# Patient Record
Sex: MALE | Race: WHITE | Employment: UNEMPLOYED | URBAN - METROPOLITAN AREA
[De-identification: names, ages, dates, MRNs, and addresses within clinical notes are randomized per-mention and may not be internally consistent; named-entity substitution may affect disease eponyms.]

---

## 2019-04-01 ENCOUNTER — APPOINTMENT (OUTPATIENT)
Dept: CT IMAGING | Age: 29
End: 2019-04-01
Payer: MEDICAID

## 2019-04-01 ENCOUNTER — HOSPITAL ENCOUNTER (EMERGENCY)
Age: 29
Discharge: HOME | End: 2019-04-01
Attending: EMERGENCY MEDICINE
Payer: MEDICAID

## 2019-04-01 VITALS
BODY MASS INDEX: 23.39 KG/M2 | RESPIRATION RATE: 14 BRPM | HEART RATE: 77 BPM | SYSTOLIC BLOOD PRESSURE: 122 MMHG | OXYGEN SATURATION: 100 % | TEMPERATURE: 98.2 F | WEIGHT: 177.25 LBS | DIASTOLIC BLOOD PRESSURE: 82 MMHG

## 2019-04-01 DIAGNOSIS — K64.5 THROMBOSED EXTERNAL HEMORRHOID: Primary | ICD-10-CM

## 2019-04-01 DIAGNOSIS — R10.9 ACUTE RIGHT FLANK PAIN: ICD-10-CM

## 2019-04-01 LAB
A/G RATIO: 1 (ref 1.1–2.2)
ALBUMIN SERPL-MCNC: 4.4 G/DL (ref 3.4–5)
ALP BLD-CCNC: 105 U/L (ref 40–129)
ALT SERPL-CCNC: 13 U/L (ref 10–40)
ANION GAP SERPL CALCULATED.3IONS-SCNC: 12 MMOL/L (ref 3–16)
AST SERPL-CCNC: 31 U/L (ref 15–37)
BASOPHILS ABSOLUTE: 0.1 K/UL (ref 0–0.2)
BASOPHILS RELATIVE PERCENT: 0.6 %
BILIRUB SERPL-MCNC: 0.4 MG/DL (ref 0–1)
BILIRUBIN URINE: NEGATIVE
BLOOD, URINE: NEGATIVE
BUN BLDV-MCNC: 8 MG/DL (ref 7–20)
CALCIUM SERPL-MCNC: 9.7 MG/DL (ref 8.3–10.6)
CHLORIDE BLD-SCNC: 101 MMOL/L (ref 99–110)
CLARITY: CLEAR
CO2: 24 MMOL/L (ref 21–32)
COLOR: YELLOW
CREAT SERPL-MCNC: 0.8 MG/DL (ref 0.9–1.3)
EOSINOPHILS ABSOLUTE: 0.1 K/UL (ref 0–0.6)
EOSINOPHILS RELATIVE PERCENT: 1.3 %
GFR AFRICAN AMERICAN: >60
GFR NON-AFRICAN AMERICAN: >60
GLOBULIN: 4.6 G/DL
GLUCOSE BLD-MCNC: 103 MG/DL (ref 70–99)
GLUCOSE URINE: NEGATIVE MG/DL
HCT VFR BLD CALC: 47.6 % (ref 40.5–52.5)
HEMOGLOBIN: 16.3 G/DL (ref 13.5–17.5)
KETONES, URINE: NEGATIVE MG/DL
LEUKOCYTE ESTERASE, URINE: NEGATIVE
LYMPHOCYTES ABSOLUTE: 1.5 K/UL (ref 1–5.1)
LYMPHOCYTES RELATIVE PERCENT: 18.2 %
MCH RBC QN AUTO: 33.6 PG (ref 26–34)
MCHC RBC AUTO-ENTMCNC: 34.3 G/DL (ref 31–36)
MCV RBC AUTO: 98 FL (ref 80–100)
MICROSCOPIC EXAMINATION: ABNORMAL
MONOCYTES ABSOLUTE: 0.7 K/UL (ref 0–1.3)
MONOCYTES RELATIVE PERCENT: 8.5 %
NEUTROPHILS ABSOLUTE: 6 K/UL (ref 1.7–7.7)
NEUTROPHILS RELATIVE PERCENT: 71.4 %
NITRITE, URINE: NEGATIVE
PDW BLD-RTO: 12.5 % (ref 12.4–15.4)
PH UA: 8.5 (ref 5–8)
PLATELET # BLD: 239 K/UL (ref 135–450)
PMV BLD AUTO: 9.1 FL (ref 5–10.5)
POTASSIUM SERPL-SCNC: 5.3 MMOL/L (ref 3.5–5.1)
PROTEIN UA: NEGATIVE MG/DL
RBC # BLD: 4.86 M/UL (ref 4.2–5.9)
SODIUM BLD-SCNC: 137 MMOL/L (ref 136–145)
SPECIFIC GRAVITY UA: 1.02 (ref 1–1.03)
TOTAL PROTEIN: 9 G/DL (ref 6.4–8.2)
URINE REFLEX TO CULTURE: ABNORMAL
URINE TYPE: ABNORMAL
UROBILINOGEN, URINE: 1 E.U./DL
WBC # BLD: 8.4 K/UL (ref 4–11)

## 2019-04-01 PROCEDURE — 80053 COMPREHEN METABOLIC PANEL: CPT

## 2019-04-01 PROCEDURE — 96372 THER/PROPH/DIAG INJ SC/IM: CPT

## 2019-04-01 PROCEDURE — 2500000003 HC RX 250 WO HCPCS: Performed by: PHYSICIAN ASSISTANT

## 2019-04-01 PROCEDURE — 85025 COMPLETE CBC W/AUTO DIFF WBC: CPT

## 2019-04-01 PROCEDURE — 74176 CT ABD & PELVIS W/O CONTRAST: CPT

## 2019-04-01 PROCEDURE — 6360000002 HC RX W HCPCS: Performed by: PHYSICIAN ASSISTANT

## 2019-04-01 PROCEDURE — 81003 URINALYSIS AUTO W/O SCOPE: CPT

## 2019-04-01 PROCEDURE — 99284 EMERGENCY DEPT VISIT MOD MDM: CPT

## 2019-04-01 PROCEDURE — 6370000000 HC RX 637 (ALT 250 FOR IP): Performed by: EMERGENCY MEDICINE

## 2019-04-01 RX ORDER — LIDOCAINE HYDROCHLORIDE AND EPINEPHRINE 20; 5 MG/ML; UG/ML
20 INJECTION, SOLUTION EPIDURAL; INFILTRATION; INTRACAUDAL; PERINEURAL ONCE
Status: COMPLETED | OUTPATIENT
Start: 2019-04-01 | End: 2019-04-01

## 2019-04-01 RX ORDER — ONDANSETRON 4 MG/1
4 TABLET, ORALLY DISINTEGRATING ORAL ONCE
Status: COMPLETED | OUTPATIENT
Start: 2019-04-01 | End: 2019-04-01

## 2019-04-01 RX ORDER — OXYCODONE HYDROCHLORIDE AND ACETAMINOPHEN 5; 325 MG/1; MG/1
1 TABLET ORAL ONCE
Status: COMPLETED | OUTPATIENT
Start: 2019-04-01 | End: 2019-04-01

## 2019-04-01 RX ORDER — KETOROLAC TROMETHAMINE 30 MG/ML
30 INJECTION, SOLUTION INTRAMUSCULAR; INTRAVENOUS ONCE
Status: COMPLETED | OUTPATIENT
Start: 2019-04-01 | End: 2019-04-01

## 2019-04-01 RX ORDER — OXYCODONE HYDROCHLORIDE AND ACETAMINOPHEN 5; 325 MG/1; MG/1
1 TABLET ORAL EVERY 6 HOURS PRN
Qty: 12 TABLET | Refills: 0 | Status: SHIPPED | OUTPATIENT
Start: 2019-04-01 | End: 2019-04-06

## 2019-04-01 RX ADMIN — HYDROMORPHONE HYDROCHLORIDE 2 MG: 1 INJECTION, SOLUTION INTRAMUSCULAR; INTRAVENOUS; SUBCUTANEOUS at 13:32

## 2019-04-01 RX ADMIN — LIDOCAINE HYDROCHLORIDE,EPINEPHRINE BITARTRATE 20 ML: 20; .005 INJECTION, SOLUTION EPIDURAL; INFILTRATION; INTRACAUDAL; PERINEURAL at 15:32

## 2019-04-01 RX ADMIN — KETOROLAC TROMETHAMINE 30 MG: 30 INJECTION, SOLUTION INTRAMUSCULAR; INTRAVENOUS at 13:32

## 2019-04-01 RX ADMIN — OXYCODONE AND ACETAMINOPHEN 1 TABLET: 5; 325 TABLET ORAL at 12:38

## 2019-04-01 RX ADMIN — LIDOCAINE HYDROCHLORIDE: 20 JELLY TOPICAL at 12:38

## 2019-04-01 RX ADMIN — ONDANSETRON 4 MG: 4 TABLET, ORALLY DISINTEGRATING ORAL at 12:38

## 2019-04-01 ASSESSMENT — PAIN DESCRIPTION - PROGRESSION
CLINICAL_PROGRESSION: NOT CHANGED
CLINICAL_PROGRESSION: NOT CHANGED

## 2019-04-01 ASSESSMENT — PAIN DESCRIPTION - FREQUENCY
FREQUENCY: CONTINUOUS
FREQUENCY: CONTINUOUS

## 2019-04-01 ASSESSMENT — PAIN SCALES - GENERAL
PAINLEVEL_OUTOF10: 8
PAINLEVEL_OUTOF10: 8
PAINLEVEL_OUTOF10: 3
PAINLEVEL_OUTOF10: 10

## 2019-04-01 ASSESSMENT — PAIN DESCRIPTION - LOCATION
LOCATION: FLANK
LOCATION: FLANK
LOCATION: RECTUM

## 2019-04-01 ASSESSMENT — PAIN DESCRIPTION - DESCRIPTORS
DESCRIPTORS: SHARP

## 2019-04-01 ASSESSMENT — PAIN DESCRIPTION - ONSET
ONSET: GRADUAL
ONSET: GRADUAL

## 2019-04-01 ASSESSMENT — PAIN - FUNCTIONAL ASSESSMENT: PAIN_FUNCTIONAL_ASSESSMENT: ACTIVITIES ARE NOT PREVENTED

## 2019-04-01 ASSESSMENT — PAIN DESCRIPTION - PAIN TYPE
TYPE: ACUTE PAIN

## 2019-04-01 ASSESSMENT — PAIN DESCRIPTION - ORIENTATION
ORIENTATION: RIGHT
ORIENTATION: RIGHT

## 2019-04-01 NOTE — ED NOTES
Pt ambulated to restroom to give urine specimen at this time.      Georgia Bell [facility]  04/01/19 7098

## 2019-04-01 NOTE — ED PROVIDER NOTES
I independently examined and evaluated Alli Villela. In brief their history revealed to emergency department with large hemorrhoids and right flank pain. He states he's had hemorrhoids before but \"not like this. \"  He denies hematuria, dysuria. He states he was having some urinary retention last night. He was able to urinate in the emergency department today. . Their exam revealed abdomen benign but right CVA tenderness to palpation. Large external thrombosed hemorrhoids. All diagnostic, treatment, and disposition decisions were made by myself in conjunction with the mid-level provider. Before disposition, questions were sought and answered with the family members. They have voiced understanding and agree with the plan. For all further details of the patient's emergency department visit, please see the mid-level practitioner's documentation.         Sheryle Printers, MD  04/03/19 5137

## 2019-04-01 NOTE — ED PROVIDER NOTES
CBC WITH AUTO DIFFERENTIAL    Narrative:     Performed at:  [place] [address]   Phone [phone]       All other labs were within normal range or not returned as of this dictation. EMERGENCY DEPARTMENT COURSE and DIFFERENTIAL DIAGNOSIS/MDM:   Vitals:    Vitals:    04/01/19 1158 04/01/19 1545   BP: 134/82 122/82   Pulse: 87 77   Resp: 16 14   SpO2: 100% 100%   Weight: 177 lb 4 oz (80.4 kg)        The patient's condition in the ED was good, the patient was afebrile and nontoxic in appearance, and the patient's physical exam was unremarkable other than for the findings noted above. He was given pain medication in the ED. Laboratory workup showed a normal white blood cell count, normal creatinine and BUN, urinalysis negative for infection or blood. CT scan abdomen and pelvis without contrast showed no acute findings. Patient had a large thrombosed external hemorrhoid, and I excised the thrombosis in the ED, giving the patient considerable relief of pain. There is a possibility that the patient has passed a kidney stone, or his abdominal symptoms may have been referred pain from his hemorrhoids. There was no indication for hospitalization or further workup. Is no be discharged with instructions for wound care, prescription for a course of pain medication, and referrals for family practice care and general surgery. The patient verbalized understanding and agreement with this plan of care. The patient was advised to return to the emergency department if symptoms should significantly worsen or if new and concerning symptoms should appear. I estimate there is LOW risk for ACUTE APPENDICITIS, BOWEL OBSTRUCTION, CHOLECYSTITIS, DIVERTICULITIS, INCARCERATED HERNIA, PANCREATITIS, PERFORATED BOWEL, BOWEL ISCHEMIA, GONADAL TORSION, OR CARDIAC ISCHEMIA, thus I consider the discharge disposition reasonable.  Also, there is no evidence or peritonitis, sepsis, or toxicity. PROCEDURES:  Thrombosed External Hemorrhoid Excision:  The patient had an external hemorrhoid noted on the right margin of the anal orifice. Incision and clot removal procedure was explained to the patient and the patient gave verbal consent. The area was cleaned with an alcohol swab and anesthetized in a field block and under the abscess by injection with equal parts 2% lidocaine with epinephrine and 0.5% bupivicaine through a 27-gauge needle. The hemorrhoid was then prepped with surgical scrub and rinsed with sterile saline. A #11 blade was used to make threee 1cm straight incisions and forceps were used to remove clotted blood. There was a small amount of bleeding but the bleeding was controlled. The patient experienced some pain but generally tolerated the procedure well. FINAL IMPRESSION      1. Thrombosed external hemorrhoid    2. Acute right flank pain          DISPOSITION/PLAN   DISPOSITION Decision To Discharge 04/01/2019 03:43:21 PM      [facility]  [phone]  Call   to get a family doctor, as needed    MD Manda Catalan. [place] [address]  [phone]    Call   As needed, for general surgery follow-up care      DISCHARGE MEDICATIONS:  New Prescriptions    OXYCODONE-ACETAMINOPHEN (PERCOCET) 5-325 MG PER TABLET    Take 1 tablet by mouth every 6 hours as needed for Pain for up to 5 days.        (Please note that portions of this note were completed with a voice recognition program.  Efforts were made to edit the dictations but occasionally words are mis-transcribed.)    Zee Cortes, [address] [place] [address],[address] [place] [address]  04/01/19 3375

## 2019-04-01 NOTE — ED NOTES
Bed: B-10  Expected date:   Expected time:   Means of arrival:   Comments:  #1     Diony Velasquez RN  04/01/19 2266

## 2019-04-10 ENCOUNTER — OFFICE VISIT (OUTPATIENT)
Dept: SURGERY | Age: 29
End: 2019-04-10
Payer: MEDICAID

## 2019-04-10 VITALS
DIASTOLIC BLOOD PRESSURE: 73 MMHG | HEIGHT: 73 IN | BODY MASS INDEX: 23.72 KG/M2 | OXYGEN SATURATION: 99 % | WEIGHT: 179 LBS | HEART RATE: 92 BPM | RESPIRATION RATE: 16 BRPM | SYSTOLIC BLOOD PRESSURE: 116 MMHG

## 2019-04-10 DIAGNOSIS — K64.5 THROMBOSED EXTERNAL HEMORRHOID: Primary | ICD-10-CM

## 2019-04-10 DIAGNOSIS — Z72.0 TOBACCO ABUSE: ICD-10-CM

## 2019-04-10 PROCEDURE — G8427 DOCREV CUR MEDS BY ELIG CLIN: HCPCS | Performed by: SURGERY

## 2019-04-10 PROCEDURE — 4004F PT TOBACCO SCREEN RCVD TLK: CPT | Performed by: SURGERY

## 2019-04-10 PROCEDURE — 99203 OFFICE O/P NEW LOW 30 MIN: CPT | Performed by: SURGERY

## 2019-04-10 PROCEDURE — G8420 CALC BMI NORM PARAMETERS: HCPCS | Performed by: SURGERY

## 2019-04-10 ASSESSMENT — ENCOUNTER SYMPTOMS
CONSTIPATION: 1
ANAL BLEEDING: 1
RESPIRATORY NEGATIVE: 1

## 2019-04-10 NOTE — PATIENT INSTRUCTIONS
CONSERVATIVE HEMORRHOID MANAGEMENT      Hemorrhoids can result from issues with chronic constipation or diarrhea, straining during bowel movements, sitting for long periods of time on the toilet, as well as obesity and pregnancy. Promoting bowel regularity and ease of passage of a bowel movement will decrease issues with hemorrhoids. One of the best ways to promote bowel regularity is by consuming a higher fiber diet. Current recommendations are that adults should try and consume 30 grams of fiber daily. Dietary fibers are either soluble or insoluble and work in slightly different ways, though are both equally important. Do not try and consume 30 grams of fiber all at once. Slowly increase your intake over a month. As you increase your fiber intake also increase the amount of water you drink. You may find it difficult to eat 30 grams of fiber daily. Fiber supplements such as Metamucil, Konsyl, or Citrucel are good sources of fiber that can be easily consumed when mixed with water. Soluble fiber is soluble in water and works by attracting water (like a sponge) to form a gel, which will slow down digestion and the movement of food through the gastrointestinal tract. Slowing digestion will make you feel full. Fiber supplements such as Metamucil, Konsyl, and Citrucel are good sources of soluble fiber. Some of the benefits of soluble fiber include weight control, lowering LDL (bad) cholesterol, improving blood sugar control, improving diarrhea, and promoting bowel regularity. Soluble fibers include psyllium, oranges, apples, pears, blueberries, strawberries, oatmeal, oat bran, oat cereal, lentils, beans, nuts, flaxseed, cucumbers, celery, and carrots. Insoluble fibers do not dissolve in water and work by adding bulk. They pass through the gastrointestinal tract relatively undigested and promote a laxative type effect, which decreases constipation.   Most of the vegetables and whole grains we eat contain insoluble fiber. The major benefit of insoluble fiber is decreasing constipation. Insoluble fibers include whole grains, whole wheat, wheat bran, bran, brown rice, nuts, barley, seeds, couscous, leafy vegetables, cabbage, broccoli, celery, carrots, green beans, cucumbers, grapes, and raisins. When starting to use a fiber supplement, such as Metamucil for example, do so slowly over a month. Start with 1 tablespoon a day for a week, increase to 2 tablespoons a day on week 2. If it is inconvenient to take a fiber supplement throughout the day is it acceptable to take it all before bed.     Please call [phone] with any questions/concerns

## 2019-04-10 NOTE — PROGRESS NOTES
Subjective:      Patient ID: Deepika Barrera is a 25 y.o. male. HPI  Chief Complaint: hemorrhoids  Patient referred by ED for evaluation of hemorrhoids. Patient reports symptoms of pain, bleeding. Location of symptoms is perianal. Symptoms were first noted 2 weeks ago. Underwent evacuation clot 9 days ago. slowly improving but still with pain. Occasionally has to strain with BMs. Has not been eating well or drinking water since his mom  2 weeks ago. Patient has a history of tobacco abuse. Will plan following treatment: fiber supplements, increase water intake as hemorrhoids resolving. Past Medical History:   Diagnosis Date    Acute hemorrhoid        Past Surgical History:   Procedure Laterality Date    HERNIA REPAIR      bilateral inguinal       Current Outpatient Medications   Medication Sig Dispense Refill    ibuprofen (ADVIL;MOTRIN) 600 MG tablet Take 1 tablet by mouth every 6 hours as needed for Pain 20 tablet 0     No current facility-administered medications for this visit. Prior to Admission medications    Medication Sig Start Date End Date Taking?  Authorizing Provider   ibuprofen (ADVIL;MOTRIN) 600 MG tablet Take 1 tablet by mouth every 6 hours as needed for Pain 17  Yes Rusty Hashimoto, DO         Allergies   Allergen Reactions    Amoxicillin        Social History     Socioeconomic History    Marital status: Single     Spouse name: Not on file    Number of children: Not on file    Years of education: Not on file    Highest education level: Not on file   Occupational History    Not on file   Social Needs    Financial resource strain: Not on file    Food insecurity:     Worry: Not on file     Inability: Not on file    Transportation needs:     Medical: Not on file     Non-medical: Not on file   Tobacco Use    Smoking status: Current Every Day Smoker     Packs/day: 1.00     Types: Cigarettes    Smokeless tobacco: Never Used   Substance and Sexual Activity    Alcohol use: No    Drug use: No    Sexual activity: Yes     Partners: Female   Lifestyle    Physical activity:     Days per week: Not on file     Minutes per session: Not on file    Stress: Not on file   Relationships    Social connections:     Talks on phone: Not on file     Gets together: Not on file     Attends Cheondo[employer] service: Not on file     Active member of club or organization: Not on file     Attends meetings of clubs or organizations: Not on file     Relationship status: Not on file    Intimate partner violence:     Fear of current or ex partner: Not on file     Emotionally abused: Not on file     Physically abused: Not on file     Forced sexual activity: Not on file   Other Topics Concern    Not on file   Social History Narrative    Not on file       History reviewed. No pertinent family history. Review of Systems   Constitutional: Negative. Respiratory: Negative. Cardiovascular: Negative. Gastrointestinal: Positive for anal bleeding and constipation. Hematological: Negative. All other systems reviewed and are negative. Objective:   Physical Exam   Constitutional: He is oriented to person, place, and time. He appears well-developed and well-nourished. He is cooperative. No distress. HENT:   Head: Normocephalic and atraumatic. Right Ear: External ear normal.   Left Ear: External ear normal.   Mouth/Throat: Oropharynx is clear and moist.   Eyes: Pupils are equal, round, and reactive to light. Conjunctivae and EOM are normal.   Neck: Trachea normal and normal range of motion. Neck supple. Cardiovascular: Normal rate, regular rhythm, S1 normal, S2 normal, normal heart sounds and intact distal pulses. Pulmonary/Chest: Effort normal and breath sounds normal. No respiratory distress. He has no wheezes. Abdominal: Soft. He exhibits no distension.    Genitourinary:   Genitourinary Comments: Soft resolving thrombosed hemorrhoid right anterior column     Musculoskeletal: Normal range of

## 2019-04-15 VITALS
DIASTOLIC BLOOD PRESSURE: 75 MMHG | BODY MASS INDEX: 24.15 KG/M2 | SYSTOLIC BLOOD PRESSURE: 124 MMHG | TEMPERATURE: 99.4 F | HEART RATE: 101 BPM | HEIGHT: 73 IN | RESPIRATION RATE: 18 BRPM | WEIGHT: 182.21 LBS | OXYGEN SATURATION: 97 %

## 2019-04-15 ASSESSMENT — PAIN DESCRIPTION - PAIN TYPE: TYPE: ACUTE PAIN

## 2019-04-15 ASSESSMENT — PAIN DESCRIPTION - LOCATION: LOCATION: ARM

## 2019-04-15 ASSESSMENT — PAIN DESCRIPTION - DESCRIPTORS: DESCRIPTORS: SORE

## 2019-04-15 ASSESSMENT — PAIN SCALES - GENERAL: PAINLEVEL_OUTOF10: 10

## 2019-04-16 ENCOUNTER — HOSPITAL ENCOUNTER (EMERGENCY)
Age: 29
Discharge: HOME | End: 2019-04-16
Attending: EMERGENCY MEDICINE
Payer: MEDICAID

## 2019-04-16 ENCOUNTER — HOSPITAL ENCOUNTER (EMERGENCY)
Age: 29
Discharge: LEFT BEFORE DISCHARGE | End: 2019-04-16
Payer: MEDICAID

## 2019-04-16 VITALS
DIASTOLIC BLOOD PRESSURE: 85 MMHG | SYSTOLIC BLOOD PRESSURE: 135 MMHG | OXYGEN SATURATION: 92 % | BODY MASS INDEX: 23.86 KG/M2 | WEIGHT: 180 LBS | RESPIRATION RATE: 20 BRPM | HEIGHT: 73 IN | TEMPERATURE: 97.8 F | HEART RATE: 90 BPM

## 2019-04-16 DIAGNOSIS — L02.91 ABSCESS: Primary | ICD-10-CM

## 2019-04-16 PROCEDURE — 99283 EMERGENCY DEPT VISIT LOW MDM: CPT

## 2019-04-16 PROCEDURE — 6370000000 HC RX 637 (ALT 250 FOR IP): Performed by: EMERGENCY MEDICINE

## 2019-04-16 PROCEDURE — 4500000023 HC ED LEVEL 3 PROCEDURE

## 2019-04-16 PROCEDURE — 4500000002 HC ER NO CHARGE

## 2019-04-16 PROCEDURE — 2500000003 HC RX 250 WO HCPCS: Performed by: EMERGENCY MEDICINE

## 2019-04-16 RX ORDER — ACETAMINOPHEN 500 MG
1000 TABLET ORAL ONCE
Status: COMPLETED | OUTPATIENT
Start: 2019-04-16 | End: 2019-04-16

## 2019-04-16 RX ORDER — SULFAMETHOXAZOLE AND TRIMETHOPRIM 800; 160 MG/1; MG/1
1 TABLET ORAL 2 TIMES DAILY
Qty: 14 TABLET | Refills: 0 | Status: SHIPPED | OUTPATIENT
Start: 2019-04-16 | End: 2019-04-23

## 2019-04-16 RX ORDER — LIDOCAINE HYDROCHLORIDE AND EPINEPHRINE 10; 10 MG/ML; UG/ML
20 INJECTION, SOLUTION INFILTRATION; PERINEURAL ONCE
Status: DISCONTINUED | OUTPATIENT
Start: 2019-04-16 | End: 2019-04-16

## 2019-04-16 RX ORDER — SULFAMETHOXAZOLE AND TRIMETHOPRIM 800; 160 MG/1; MG/1
1 TABLET ORAL ONCE
Status: COMPLETED | OUTPATIENT
Start: 2019-04-16 | End: 2019-04-16

## 2019-04-16 RX ORDER — LIDOCAINE HYDROCHLORIDE 10 MG/ML
20 INJECTION, SOLUTION EPIDURAL; INFILTRATION; INTRACAUDAL; PERINEURAL ONCE
Status: COMPLETED | OUTPATIENT
Start: 2019-04-16 | End: 2019-04-16

## 2019-04-16 RX ORDER — NAPROXEN 500 MG/1
500 TABLET ORAL 2 TIMES DAILY WITH MEALS
Qty: 10 TABLET | Refills: 0 | Status: SHIPPED | OUTPATIENT
Start: 2019-04-16 | End: 2021-07-20

## 2019-04-16 RX ORDER — IBUPROFEN 400 MG/1
800 TABLET ORAL ONCE
Status: COMPLETED | OUTPATIENT
Start: 2019-04-16 | End: 2019-04-16

## 2019-04-16 RX ADMIN — SULFAMETHOXAZOLE AND TRIMETHOPRIM 1 TABLET: 800; 160 TABLET ORAL at 18:33

## 2019-04-16 RX ADMIN — ACETAMINOPHEN 1000 MG: 500 TABLET ORAL at 18:33

## 2019-04-16 RX ADMIN — IBUPROFEN 800 MG: 400 TABLET, FILM COATED ORAL at 18:33

## 2019-04-16 RX ADMIN — LIDOCAINE HYDROCHLORIDE 20 ML: 10 INJECTION, SOLUTION EPIDURAL; INFILTRATION; INTRACAUDAL; PERINEURAL at 17:53

## 2019-04-16 RX ADMIN — LIDOCAINE HYDROCHLORIDE 20 ML: 10 INJECTION, SOLUTION EPIDURAL; INFILTRATION; INTRACAUDAL; PERINEURAL at 17:24

## 2019-04-16 ASSESSMENT — PAIN SCALES - GENERAL
PAINLEVEL_OUTOF10: 10

## 2019-04-16 ASSESSMENT — ENCOUNTER SYMPTOMS
GASTROINTESTINAL NEGATIVE: 1
ALLERGIC/IMMUNOLOGIC NEGATIVE: 1
RESPIRATORY NEGATIVE: 1
EYES NEGATIVE: 1

## 2019-04-16 ASSESSMENT — PAIN DESCRIPTION - PAIN TYPE: TYPE: ACUTE PAIN

## 2019-04-16 ASSESSMENT — PAIN DESCRIPTION - LOCATION: LOCATION: ARM

## 2019-04-16 ASSESSMENT — PAIN DESCRIPTION - ORIENTATION: ORIENTATION: LEFT

## 2019-04-16 NOTE — ED TRIAGE NOTES
Pt states that he noticed a abscess under his left arm pit. Pt also noticed another abscess to his butt cheek.  Pt tearful at triage

## 2019-04-16 NOTE — ED PROVIDER NOTES
[address] ENCOUNTER          ATTENDING PHYSICIAN NOTE       Date of evaluation: 4/16/2019    Chief Complaint     Abscess      History of Present Illness     Maria L Patel is a 25 y.o. male who presents with skin abscesses. He reports been present for about 5 days, localized underneath his left arm. He denies radiation of pain outside the axilla, denies fever, chest pain, or any other systemic symptoms. Reports the pain in the axilla seems to be getting worse. Also reports that he has a similar but very small area on his left butt cheek which has not been draining. He reports that the lesion under his arm has been draining when he squeezes it. Review of Systems     Review of Systems   Constitutional: Negative. HENT: Negative. Eyes: Negative. Respiratory: Negative. Cardiovascular: Negative. Gastrointestinal: Negative. Endocrine: Negative. Genitourinary: Negative. Musculoskeletal: Negative. Pain in axilla   Skin: Positive for wound. Allergic/Immunologic: Negative. Neurological: Negative. Hematological: Negative. Psychiatric/Behavioral: Negative. Past Medical, Surgical, Family, and Social History     He has a past medical history of Acute hemorrhoid. He has a past surgical history that includes hernia repair. His family history is not on file. He reports that he has been smoking cigarettes. He has been smoking about 1.00 pack per day. He has never used smokeless tobacco. He reports that he does not drink alcohol or use drugs. Medications     Previous Medications    IBUPROFEN (ADVIL;MOTRIN) 600 MG TABLET    Take 1 tablet by mouth every 6 hours as needed for Pain       Allergies     He is allergic to amoxicillin. Physical Exam     INITIAL VITALS: BP: (!) 148/92, Temp: 97.8 °F (36.6 °C), Pulse: 107, Resp: 16, SpO2: 92 %    Physical Exam   Constitutional: He is oriented to person, place, and time.  He appears well-developed. Anxious appearing   HENT:   Head: Normocephalic and atraumatic. Mouth/Throat: Oropharynx is clear and moist.   Eyes: Pupils are equal, round, and reactive to light. Neck: Normal range of motion. Cardiovascular: Normal rate and normal heart sounds. No murmur heard. Pulmonary/Chest: Effort normal and breath sounds normal.   Abdominal: Soft. Bowel sounds are normal.   Musculoskeletal:   4x3 cm area of induartion and fluctuance left anterior axillary line, no purulent drainge noted. 1x1 cm area of induration without fluctuance left buttock   Neurological: He is alert and oriented to person, place, and time. Skin: Skin is warm and dry. Psychiatric:   anxious       Diagnostic Results         RADIOLOGY:  No orders to display       LABS:   No results found for this visit on 04/16/19. ED BEDSIDE ULTRASOUND:  Ultrasound demonstrates approximately 1.5 x 2 cm pocket in the left axilla in the center of the area of induration with some surrounding cobblestoning    RECENT VITALS:  BP: 135/85,Temp: 97.8 °F (36.6 °C), Pulse: 90, Resp: 20, SpO2: 92 %     Procedures     Incision/Drainage  Date/Time: 4/16/2019 6:33 PM  Performed by: Shadi Patterson MD  Authorized by: Shadi Patterson MD     Consent:     Consent obtained:  Verbal    Consent given by:  Patient    Risks discussed:  Bleeding, incomplete drainage and infection    Alternatives discussed:  No treatment  Location:     Type:  Abscess    Size:  2x4 cm    Location:  Upper extremity    Upper extremity location:  Shoulder    Shoulder location:  L shoulder (axilla)  Pre-procedure details:     Skin preparation:  Chloraprep  Anesthesia (see MAR for exact dosages):      Anesthesia method:  Local infiltration    Local anesthetic:  Lidocaine 1% w/o epi  Procedure type:     Complexity:  Complex  Procedure details:     Incision types:  Stab incision    Incision depth:  Subcutaneous    Scalpel blade:  11    Wound management:  Probed and deloculated and irrigated with saline    Drainage:  Bloody    Drainage amount: Moderate    Wound treatment:  Drain placed    Packing material: penrose drain in loop configuration. Post-procedure details:     Patient tolerance of procedure: Tolerated with difficulty  Comments:      Penrose drain placed as round loop and tied. ED Course     Nursing Notes, Past Medical Hx, Past Surgical Hx, Social Hx,Allergies, and Family Hx were reviewed. The patient was given the following medications:  Orders Placed This Encounter   Medications    DISCONTD: lidocaine-EPINEPHrine 1 percent-1:272176 injection 20 mL    lidocaine PF 1 % injection 20 mL    ibuprofen (ADVIL;MOTRIN) tablet 800 mg    acetaminophen (TYLENOL) tablet 1,000 mg    sulfamethoxazole-trimethoprim (BACTRIM DS;SEPTRA DS) 800-160 MG per tablet 1 tablet    lidocaine 1 % injection 20 mL    sulfamethoxazole-trimethoprim (BACTRIM DS) 800-160 MG per tablet     Sig: Take 1 tablet by mouth 2 times daily for 7 days     Dispense:  14 tablet     Refill:  0    naproxen (NAPROSYN) 500 MG tablet     Sig: Take 1 tablet by mouth 2 times daily (with meals) for 5 days     Dispense:  10 tablet     Refill:  0       CONSULTS:  None    MEDICAL DECISIONMAKING / ASSESSMENT / Si Daft is a 25 y.o. male this with abscess in the left axilla. Abscess was drained in the emergency department and a loop was placed. He was instructed on the need to be seen in 1 week to have the wick removed. We will prescribe some Bactrim, Milton Solaresimer been isolated loculated lesion most consistent with staph infection, and outside of the immediate area of induration there is really no sniffing and cellulitis. We will go ahead and start patient on antibiotics, which also helped with the small abscess on his left buttocks which he says he really doesn't want to have drained today.   I think this is reasonable as it appears to be draining already and has essentially no fluctuance in the area and is very small. He has no systemic symptoms, was a little bit tachycardic on arrival but was also notably anxious. With calming down, his heart rates within normal limits, he is afebrile, looks well, comfortable going home. Clinical Impression     1.  Abscess        Disposition     PATIENT REFERRED TO:  General Surgery  [facility] [id number]  [phone]  In 1 week        DISCHARGE MEDICATIONS:  New Prescriptions    NAPROXEN (NAPROSYN) 500 MG TABLET    Take 1 tablet by mouth 2 times daily (with meals) for 5 days    SULFAMETHOXAZOLE-TRIMETHOPRIM (BACTRIM DS) 800-160 MG PER TABLET    Take 1 tablet by mouth 2 times daily for 7 days       DISPOSITION Decision To Discharge 04/16/2019 06:27:38 PM         Chely Thomas MD  04/16/19 5651

## 2019-11-18 ENCOUNTER — HOSPITAL ENCOUNTER (EMERGENCY)
Age: 29
Discharge: HOME | End: 2019-11-18
Attending: EMERGENCY MEDICINE
Payer: MEDICAID

## 2019-11-18 VITALS
DIASTOLIC BLOOD PRESSURE: 78 MMHG | RESPIRATION RATE: 19 BRPM | SYSTOLIC BLOOD PRESSURE: 136 MMHG | HEART RATE: 75 BPM | HEIGHT: 73 IN | TEMPERATURE: 98.7 F | BODY MASS INDEX: 20.72 KG/M2 | WEIGHT: 156.31 LBS | OXYGEN SATURATION: 96 %

## 2019-11-18 DIAGNOSIS — F42.4 SKIN-PICKING DISORDER: Primary | ICD-10-CM

## 2019-11-18 PROCEDURE — 99282 EMERGENCY DEPT VISIT SF MDM: CPT

## 2019-11-18 PROCEDURE — 6370000000 HC RX 637 (ALT 250 FOR IP): Performed by: EMERGENCY MEDICINE

## 2019-11-18 RX ORDER — SULFAMETHOXAZOLE AND TRIMETHOPRIM 800; 160 MG/1; MG/1
1 TABLET ORAL ONCE
Status: COMPLETED | OUTPATIENT
Start: 2019-11-18 | End: 2019-11-18

## 2019-11-18 RX ORDER — SULFAMETHOXAZOLE AND TRIMETHOPRIM 800; 160 MG/1; MG/1
1 TABLET ORAL 2 TIMES DAILY
Qty: 14 TABLET | Refills: 0 | Status: SHIPPED | OUTPATIENT
Start: 2019-11-18 | End: 2019-11-25

## 2019-11-18 RX ADMIN — SULFAMETHOXAZOLE AND TRIMETHOPRIM 1 TABLET: 800; 160 TABLET ORAL at 20:08

## 2019-11-18 ASSESSMENT — PAIN DESCRIPTION - PAIN TYPE
TYPE: ACUTE PAIN
TYPE: ACUTE PAIN

## 2019-11-18 ASSESSMENT — PAIN DESCRIPTION - FREQUENCY: FREQUENCY: CONTINUOUS

## 2019-11-18 ASSESSMENT — PAIN DESCRIPTION - DESCRIPTORS: DESCRIPTORS: DISCOMFORT

## 2019-11-18 ASSESSMENT — PAIN SCALES - GENERAL
PAINLEVEL_OUTOF10: 9
PAINLEVEL_OUTOF10: 9

## 2019-11-18 ASSESSMENT — PAIN DESCRIPTION - LOCATION
LOCATION: FACE
LOCATION: FACE

## 2020-06-25 ENCOUNTER — HOSPITAL ENCOUNTER (EMERGENCY)
Age: 30
Discharge: HOME | End: 2020-06-25
Payer: COMMERCIAL

## 2020-06-25 VITALS
HEIGHT: 74 IN | SYSTOLIC BLOOD PRESSURE: 113 MMHG | BODY MASS INDEX: 21.81 KG/M2 | RESPIRATION RATE: 16 BRPM | HEART RATE: 60 BPM | DIASTOLIC BLOOD PRESSURE: 77 MMHG | WEIGHT: 169.97 LBS | OXYGEN SATURATION: 96 % | TEMPERATURE: 97.7 F

## 2020-06-25 PROCEDURE — 99282 EMERGENCY DEPT VISIT SF MDM: CPT

## 2020-06-25 RX ORDER — HYDROCORTISONE 25 MG/G
CREAM TOPICAL
Qty: 1 TUBE | Refills: 1 | Status: SHIPPED | OUTPATIENT
Start: 2020-06-25

## 2020-06-25 RX ORDER — HYDROCORTISONE ACETATE 25 MG/1
25 SUPPOSITORY RECTAL 2 TIMES DAILY
Qty: 20 SUPPOSITORY | Refills: 0 | Status: SHIPPED | OUTPATIENT
Start: 2020-06-25

## 2020-06-25 ASSESSMENT — PAIN DESCRIPTION - FREQUENCY
FREQUENCY: CONTINUOUS
FREQUENCY: CONTINUOUS

## 2020-06-25 ASSESSMENT — PAIN SCALES - GENERAL
PAINLEVEL_OUTOF10: 7
PAINLEVEL_OUTOF10: 7

## 2020-06-25 ASSESSMENT — PAIN DESCRIPTION - PAIN TYPE
TYPE: ACUTE PAIN
TYPE: ACUTE PAIN

## 2020-06-25 ASSESSMENT — PAIN DESCRIPTION - PROGRESSION
CLINICAL_PROGRESSION: GRADUALLY WORSENING
CLINICAL_PROGRESSION: NOT CHANGED

## 2020-06-25 ASSESSMENT — PAIN DESCRIPTION - ONSET
ONSET: GRADUAL
ONSET: GRADUAL

## 2020-06-25 ASSESSMENT — PAIN DESCRIPTION - ORIENTATION
ORIENTATION: INNER
ORIENTATION: INNER

## 2020-06-25 ASSESSMENT — PAIN - FUNCTIONAL ASSESSMENT: PAIN_FUNCTIONAL_ASSESSMENT: 0-10

## 2020-06-25 ASSESSMENT — PAIN DESCRIPTION - LOCATION
LOCATION: RECTUM
LOCATION: RECTUM

## 2020-06-25 ASSESSMENT — PAIN DESCRIPTION - DESCRIPTORS
DESCRIPTORS: THROBBING
DESCRIPTORS: THROBBING

## 2021-03-30 ENCOUNTER — HOSPITAL ENCOUNTER (EMERGENCY)
Age: 31
Discharge: HOME | End: 2021-03-30
Attending: EMERGENCY MEDICINE
Payer: COMMERCIAL

## 2021-03-30 VITALS
WEIGHT: 174 LBS | OXYGEN SATURATION: 100 % | BODY MASS INDEX: 22.34 KG/M2 | TEMPERATURE: 98.4 F | SYSTOLIC BLOOD PRESSURE: 100 MMHG | RESPIRATION RATE: 16 BRPM | DIASTOLIC BLOOD PRESSURE: 76 MMHG | HEART RATE: 110 BPM

## 2021-03-30 DIAGNOSIS — R11.2 NON-INTRACTABLE VOMITING WITH NAUSEA, UNSPECIFIED VOMITING TYPE: ICD-10-CM

## 2021-03-30 DIAGNOSIS — T40.1X1A ACCIDENTAL OVERDOSE OF HEROIN, INITIAL ENCOUNTER (HCC): Primary | ICD-10-CM

## 2021-03-30 PROCEDURE — 99283 EMERGENCY DEPT VISIT LOW MDM: CPT

## 2021-03-30 RX ORDER — ONDANSETRON 4 MG/1
4 TABLET, ORALLY DISINTEGRATING ORAL EVERY 8 HOURS PRN
Qty: 20 TABLET | Refills: 0 | Status: SHIPPED | OUTPATIENT
Start: 2021-03-30 | End: 2021-07-20

## 2021-03-30 ASSESSMENT — ENCOUNTER SYMPTOMS
COUGH: 0
COLOR CHANGE: 0
ABDOMINAL PAIN: 0
WHEEZING: 0
NAUSEA: 1
VOMITING: 1
SORE THROAT: 0
BACK PAIN: 0
SHORTNESS OF BREATH: 0
DIARRHEA: 0

## 2021-03-30 ASSESSMENT — PAIN SCALES - GENERAL: PAINLEVEL_OUTOF10: 0

## 2021-03-31 NOTE — ED NOTES
Pt curled up on right side admits to using thought was heroin     Salma Stuart, EMERITA  03/30/21 1529

## 2021-03-31 NOTE — ED PROVIDER NOTES
**ADVANCED PRACTICE PROVIDER, I HAVE EVALUATED THIS PATIENT**        [address] [facility] ENCOUNTER      Pt Name: Yasmine Collazo  YPU:[id number]  [place] 1990  Date of evaluation: 3/30/2021  Provider: KAILEY Polanco CNP      Chief Complaint:    Chief Complaint   Patient presents with    Drug Overdose     Pt states he doesnt know what he took, pt states he snorted it, pt states feeling weird, per girlfriend pt got narcan at home       Nursing Notes, Past Medical Hx, Past Surgical Hx, Social Hx, Allergies, and Family Hx were all reviewed and agreed with or any disagreements were addressed in the HPI.    HPI:  (Location, Duration, Timing, Severity, Quality, Assoc Sx, Context, Modifying factors)  This is a  26 y.o. male presents emergency department with his father and fiancé for drug overdose. Patient's fiancé is also a patient as they both snorted some type of powder, initially he tells me that they snorted heroin however, his fiancée is saying that she snorted fentanyl. Patient's dad states that the patient has been telling him he has been clean, however patient was feeling weird after snorting this type of substance, he was unsure exactly what it was, his fiancée states he was getting very sleepy and tired, she gave him Narcan. Patient arrives to the ER awake and alert however he is actively vomiting. Patient denies any recent illnesses, fever, chills, cough or congestion. He does admit to snorting heroin on a daily basis. He denies any pain on exam, he states he feels very nauseous. He denies any additional complaints, no additional aggravating relieving factors. The patient presents awake, alert and in no acute distress or toxic appearance.     PastMedical/Surgical History:      Diagnosis Date    Acute hemorrhoid          Procedure Laterality Date    HERNIA REPAIR      bilateral inguinal       Medications:  Discharge Medication List as of 3/30/2021 10:13 PM      CONTINUE these medications which have NOT CHANGED    Details   ANUSOL-HC 2.5 % CREA rectal cream Apply to rectum TID PRN for anal pain. Do not use with suppositories. , Disp-1 Tube, R-1, DAWPrint      hydrocortisone (ANUSOL-HC) 25 MG suppository Place 1 suppository rectally 2 times daily, Disp-20 suppository, R-0Print      naproxen (NAPROSYN) 500 MG tablet Take 1 tablet by mouth 2 times daily (with meals) for 5 days, Disp-10 tablet, R-0Print      ibuprofen (ADVIL;MOTRIN) 600 MG tablet Take 1 tablet by mouth every 6 hours as needed for Pain, Disp-20 tablet, R-0Print               Review of Systems:  Review of Systems   Constitutional: Negative for chills and fever. HENT: Negative for congestion and sore throat. Respiratory: Negative for cough, shortness of breath and wheezing. Cardiovascular: Negative for chest pain. Gastrointestinal: Positive for nausea and vomiting. Negative for abdominal pain and diarrhea. Patient's girlfriend gave him Narcan prior to ED arrival, he arrived to the ER actively vomiting. Genitourinary: Negative for dysuria, frequency and hematuria. Musculoskeletal: Negative for back pain. Skin: Negative for color change. Neurological: Negative for weakness, numbness and headaches. Patient started feeling very weird after snorting a substance that he thought was heroin. He does admit to drug use daily, uses heroin intranasally daily. Positives and Pertinent negatives as per HPI. Except as noted above in the ROS, problem specific ROS was completed and is negative. Physical Exam:  Physical Exam  Vitals signs and nursing note reviewed. Constitutional:       Appearance: He is well-developed. He is not diaphoretic. HENT:      Head: Normocephalic. Right Ear: External ear normal.      Left Ear: External ear normal.   Eyes:      General: No scleral icterus. Right eye: No discharge. Left eye: No discharge.    Neck: Musculoskeletal: Normal range of motion and neck supple. Cardiovascular:      Rate and Rhythm: Tachycardia present. Comments: Normal S1 and 2, initially tachycardic, peripheral pulses are 2+ and thready, patient did receive Narcan recently. Pulmonary:      Effort: Pulmonary effort is normal. No respiratory distress. Breath sounds: Normal breath sounds. Abdominal:      General: Bowel sounds are normal.      Palpations: Abdomen is soft. Musculoskeletal: Normal range of motion. Skin:     General: Skin is warm. Capillary Refill: Capillary refill takes less than 2 seconds. Coloration: Skin is not pale. Neurological:      General: No focal deficit present. Mental Status: He is alert and oriented to person, place, and time. GCS: GCS eye subscore is 4. GCS verbal subscore is 5. GCS motor subscore is 6. Psychiatric:         Behavior: Behavior normal.         MEDICAL DECISION MAKING    Vitals:    Vitals:    03/30/21 2058 03/30/21 2133 03/30/21 2156 03/30/21 2204   BP: 124/88 133/72 105/68 100/76   Pulse: 126 111 116 110   Resp: 22 18 16    Temp: 98.4 °F (36.9 °C)      TempSrc: Oral      SpO2: 99% 98% 96% 100%   Weight:    174 lb (78.9 kg)       LABS:Labs Reviewed - No data to display     Remainder of labs reviewed and werenegative at this time or not returned at the time of this note. RADIOLOGY:   Non-plain film images such as CT, Ultrasound and MRI are read by the radiologist. Shanel YEPEZ APRN - CNP have directly visualized the radiologic plain film image(s) with the below findings:        Interpretation per the Radiologist below, if available at the time of this note:    No orders to display        No results found.        MEDICAL DECISION MAKING / ED COURSE:    Because of high probability of sudden clinical deterioration of the patient's condition and risk of further deterioration, critical care time required my full attention to the patient's condition; which included consider the discharge disposition reasonable. Therefore, shared medical decision was made between the patient and myself only agreed that the patient could be discharged home with outpatient follow-up. Patient was discharged home with referral back to PCP. Discharged home about education with opiate withdrawal, cessation of use. He was given Narcan overdose kit. Educated to return for any worsening symptoms. The patient tolerated their visit well. I evaluated the patient. The physician was available for consultation as needed. The patient and / or the family were informed of the results of any tests, a time was given to answer questions, a plan was proposed and they agreed with plan. Patient and father verbalized understanding of discharge instructions and the patient was discharged from the department in stable condition. CLINICAL IMPRESSION:  1. Accidental overdose of heroin, initial encounter ([facility] [address]    2.  Non-intractable vomiting with nausea, unspecified vomiting type        DISPOSITION Decision To Discharge 03/30/2021 10:11:39 PM      PATIENT REFERRED TO:  [facility]) Pre-Services  [phone]  Schedule an appointment as soon as possible for a visit in 2 days  Follow-up with PCP in the next 2 to 3 days for reevaluation    [address] [facility] [place] [address]  [phone]  Go to   If symptoms worsen      DISCHARGE MEDICATIONS:  Discharge Medication List as of 3/30/2021 10:13 PM      START taking these medications    Details   Naloxone HCl (NALOXONE OPIATE OVERDOSE KIT) 1 each by Nasal route once for 1 dose, Disp-1 kit, R-0Print      ondansetron (ZOFRAN ODT) 4 MG disintegrating tablet Take 1 tablet by mouth every 8 hours as needed for Nausea, Disp-20 tablet, R-0Print             DISCONTINUED MEDICATIONS:  Discharge Medication List as of 3/30/2021 10:13 PM                 (Please note the MDM and HPI sections of this note were completed with a voice recognition program.  Efforts were made to edit the dictations but occasionally words are mis-transcribed.)    Electronically signed, KAILEY Hart CNP,           KAILEY Hart CNP  04/02/21 1022

## 2021-07-20 ENCOUNTER — APPOINTMENT (OUTPATIENT)
Dept: GENERAL RADIOLOGY | Age: 31
End: 2021-07-20
Payer: COMMERCIAL

## 2021-07-20 ENCOUNTER — HOSPITAL ENCOUNTER (EMERGENCY)
Age: 31
Discharge: HOME | End: 2021-07-20
Attending: STUDENT IN AN ORGANIZED HEALTH CARE EDUCATION/TRAINING PROGRAM
Payer: COMMERCIAL

## 2021-07-20 VITALS
HEART RATE: 78 BPM | TEMPERATURE: 98.2 F | BODY MASS INDEX: 22.13 KG/M2 | WEIGHT: 167 LBS | DIASTOLIC BLOOD PRESSURE: 74 MMHG | OXYGEN SATURATION: 90 % | SYSTOLIC BLOOD PRESSURE: 136 MMHG | RESPIRATION RATE: 16 BRPM | HEIGHT: 73 IN

## 2021-07-20 DIAGNOSIS — J18.9 PNEUMONIA OF BOTH LUNGS DUE TO INFECTIOUS ORGANISM, UNSPECIFIED PART OF LUNG: Primary | ICD-10-CM

## 2021-07-20 PROCEDURE — 6370000000 HC RX 637 (ALT 250 FOR IP): Performed by: STUDENT IN AN ORGANIZED HEALTH CARE EDUCATION/TRAINING PROGRAM

## 2021-07-20 PROCEDURE — 99284 EMERGENCY DEPT VISIT MOD MDM: CPT

## 2021-07-20 PROCEDURE — 71045 X-RAY EXAM CHEST 1 VIEW: CPT

## 2021-07-20 RX ORDER — DIPHENHYDRAMINE HCL 25 MG
25 CAPSULE ORAL EVERY 4 HOURS PRN
Qty: 20 CAPSULE | Refills: 0 | Status: SHIPPED | OUTPATIENT
Start: 2021-07-20 | End: 2021-07-30

## 2021-07-20 RX ORDER — ACETAMINOPHEN 325 MG/1
650 TABLET ORAL ONCE
Status: COMPLETED | OUTPATIENT
Start: 2021-07-20 | End: 2021-07-20

## 2021-07-20 RX ORDER — BENZONATATE 100 MG/1
100-200 CAPSULE ORAL 3 TIMES DAILY PRN
Qty: 60 CAPSULE | Refills: 0 | Status: SHIPPED | OUTPATIENT
Start: 2021-07-20 | End: 2021-07-30

## 2021-07-20 RX ORDER — AZITHROMYCIN 250 MG/1
250 TABLET, FILM COATED ORAL SEE ADMIN INSTRUCTIONS
Qty: 6 TABLET | Refills: 0 | Status: SHIPPED | OUTPATIENT
Start: 2021-07-20 | End: 2021-07-25

## 2021-07-20 RX ADMIN — ACETAMINOPHEN 650 MG: 325 TABLET ORAL at 09:48

## 2021-07-20 ASSESSMENT — PAIN SCALES - GENERAL
PAINLEVEL_OUTOF10: 6
PAINLEVEL_OUTOF10: 4
PAINLEVEL_OUTOF10: 6

## 2021-07-20 ASSESSMENT — PAIN DESCRIPTION - LOCATION: LOCATION: THROAT;RIB CAGE

## 2021-07-20 ASSESSMENT — PAIN DESCRIPTION - PAIN TYPE: TYPE: ACUTE PAIN

## 2021-07-20 ASSESSMENT — PAIN DESCRIPTION - DESCRIPTORS: DESCRIPTORS: SORE

## 2021-07-20 NOTE — ED PROVIDER NOTES
[address] [facility] ENCOUNTER      Pt Name: Evaristo Cote  MRN: [id number]  [place] 1990  Date of evaluation: 7/20/2021  Provider: Angelic Dhillon MD    CHIEF COMPLAINT       Chief Complaint   Patient presents with    URI     cough, SOB, congested, sore throat,body aches. Wife stated he had a fever yesterday, he denied any fever     Cough, shortness of breath, congested. HISTORY OF PRESENT ILLNESS   (Location/Symptom, Timing/Onset,Context/Setting, Quality, Duration, Modifying Factors, Severity)  Note limiting factors. Evaristo Cote is a 26 y.o. male who presents to the emergency department complaint of cough for the past week, shortness of breath started yesterday. Onset gradual, progressively worse, associated with congestion, cough productive of sputum, sore throat, body aches. The wife is concerned that he had a fever yesterday, patient however denies any fever. He does not know if he has been exposed any sick contacts, states he does not believe in the Covid vaccine and therefore did not get it. Mild chest discomfort described as sharpness when coughing. Symptoms not otherwise alleviated or exacerbated by the factors. NursingNotes were reviewed. REVIEW OF SYSTEMS    (2-9 systems for level 4, 10 or more for level 5)       Constitutional: No fever or chills. Eye: No visual disturbances. No eye pain. Ear/Nose/Mouth/Throat: No nasal congestion. No sore throat. Respiratory: + cough, + shortness of breath, + sputum production. Cardiovascular: + chest pain. No palpitations. Gastrointestinal: No abdominal pain. No nausea or vomiting  Genitourinary: No dysuria. No hematuria. Hematology/Lymphatics: No bleeding or bruising tendency. Immunologic: No malaise. No swollen glands. Musculoskeletal: No back pain. No joint pain. Integumentary: No rash. No abrasions. Neurologic: No headache. No focal numbness or weakness.       PAST MEDICAL HISTORY     Past Medical History:   Diagnosis Date    Acute hemorrhoid          SURGICALHISTORY       Past Surgical History:   Procedure Laterality Date    HEMORRHOID SURGERY      HERNIA REPAIR      bilateral inguinal         CURRENT MEDICATIONS       Discharge Medication List as of 7/20/2021 10:22 AM      CONTINUE these medications which have NOT CHANGED    Details   ANUSOL-HC 2.5 % CREA rectal cream Apply to rectum TID PRN for anal pain. Do not use with suppositories. , Disp-1 Tube, R-1, DAWPrint      hydrocortisone (ANUSOL-HC) 25 MG suppository Place 1 suppository rectally 2 times daily, Disp-20 suppository, R-0Print             ALLERGIES     Amoxicillin    FAMILY HISTORY     History reviewed. No pertinent family history. SOCIAL HISTORY       Social History     Socioeconomic History    Marital status: Single     Spouse name: None    Number of children: None    Years of education: None    Highest education level: None   Occupational History    None   Tobacco Use    Smoking status: Current Every Day Smoker     Packs/day: 2.00     Types: Cigarettes    Smokeless tobacco: Current User   Vaping Use    Vaping Use: Never used   Substance and Sexual Activity    Alcohol use: No    Drug use: No     Comment: started using again snorting heroin today    Sexual activity: Yes     Partners: Female   Other Topics Concern    None   Social History Narrative    None     Social Determinants of Health     Financial Resource Strain:     Difficulty of Paying Living Expenses:    Food Insecurity:     Worried About Running Out of Food in the Last Year:     Ran Out of Food in the Last Year:    Transportation Needs:     Lack of Transportation (Medical):      Lack of Transportation (Non-Medical):    Physical Activity:     Days of Exercise per Week:     Minutes of Exercise per Session:    Stress:     Feeling of Stress :    Social Connections:     Frequency of Communication with Friends and Family:     Frequency of Social Gatherings with Friends and Family:     Attends Rastafari Services:     Active Member of Clubs or Organizations:     Attends Club or Organization Meetings:     Marital Status:    Intimate Partner Violence:     Fear of Current or Ex-Partner:     Emotionally Abused:     Physically Abused:     Sexually Abused:        SCREENINGS             PHYSICAL EXAM    (up to 7 for level 4, 8 or more for level 5)     ED Triage Vitals [21 0934]   BP Temp Temp Source Pulse Resp SpO2 Height Weight   136/74 98.2 °F (36.8 °C) Oral 78 16 90 % 6' 1\" (1.854 m) 167 lb (75.8 kg)       General: Alert and oriented appropriately for age, No acute distress. Nontoxic-appearing. Eye: Normal conjunctiva. Pupils equal and reactive. HENT: Oral mucosa is moist.  Respiratory: Respirations even and non-labored. Coarse breath sounds bilaterally. Cardiovascular: Normal rate, Regular rhythm. Intact peripheral pulses. No edema. No JVD. Gastrointestinal: Soft, Non-tender, Non-distended. Musculoskeletal: No swelling. Integumentary: Warm, Dry. Neurologic: Alert and appropriate for age. No focal deficits. Psychiatric: Cooperative. DIAGNOSTIC RESULTS         RADIOLOGY:   Non-plain filmimages such as CT, Ultrasound and MRI are read by the radiologist. Plain radiographic images are visualized and preliminarily interpreted by the emergency physician with the below findings:      Interpretation per the Radiologist below, if available at the time ofthis note:    XR CHEST PORTABLE   Final Result   Bilateral infrahilar airspace disease greater on the right. This could   represent atelectasis and or pneumonia. Gilson Monaco                EMERGENCY DEPARTMENT COURSE and DIFFERENTIAL DIAGNOSIS/MDM:   Vitals:    Vitals:    21 0934   BP: 136/74   Pulse: 78   Resp: 16   Temp: 98.2 °F (36.8 °C)   TempSrc: Oral   SpO2: 90%   Weight: 167 lb (75.8 kg)   Height: 6' 1\" (1.854 m)         Medical decision makin-year-old man who presents with cough, shortness of breath only while he was working, sputum production, sore throat, body aches, on exam, no evidence of any exudative pharyngitis or findings concerning for bacterial pharyngitis however patient does have mild erythema here, does have coarse breath sounds bilaterally, significant smoking history, smoking 2 packs/day. Possibly undiagnosed COPD. Patient does have slight hypoxia 90 to 92% on room air, he appears in no respiratory distress, no increased work of breathing, moving air well to the bilateral bases, this is unlikely an obstructive process. He is offered admission for hypoxemic respiratory failure, diagnosis of subsequent pneumonia which is likely responsible for his symptoms given the multifocal nature, potential viral pneumonia as patient is unvaccinated, patient declined Covid testing despite counseling. As he is wholly nontoxic-appearing, patient wishes to be discharged home with antibiotics. Counseled patient that should he have any symptomatic worsening, he should return to the emergency department for admission, further treatment. He voices understanding, understands the risks of potential decompensation and death by being discharged home with his mild hypoxia without further treatment and supportive care. Subsequently discharged home, ambulated steadily from the emergency department upon discharge, Rx antibiotics, Tessalon Perles. Medications   acetaminophen (TYLENOL) tablet 650 mg (650 mg Oral Given 7/20/21 0948)         Low risk for PE and PERC criteria negative:    Age 48 or less   or less  SpO2 on RA 95% or greater  No Hx of VTE  No trauma or surgery within 4 weeks  No hemoptysis  No exogenous estrogen  No unilateral leg swelling. Given these findings, there is a less than a 2% risk that Vinny Hamilton has a PE and no further work-up is needed in conjunction with my shared decision making discussion with Vinny Hamilton. FINAL IMPRESSION      1.  Pneumonia of both lungs due to infectious organism, unspecified part of lung          DISPOSITION/PLAN   DISPOSITION Decision To Discharge 07/20/2021 10:06:16 AM      PATIENT REFERRED TO:  [address]  [place] [address]  [phone]    If symptoms worsen    [facility]) Pre-Services  [phone]          DISCHARGE MEDICATIONS:  Discharge Medication List as of 7/20/2021 10:22 AM      START taking these medications    Details   azithromycin (ZITHROMAX) 250 MG tablet Take 1 tablet by mouth See Admin Instructions for 5 days 500mg on day 1 followed by 250mg on days 2 - 5, Disp-6 tablet, R-0Print      benzonatate (TESSALON) 100 MG capsule Take 1-2 capsules by mouth 3 times daily as needed for Cough, Disp-60 capsule, R-0Print      diphenhydrAMINE (BENADRYL) 25 MG capsule Take 1 capsule by mouth every 4 hours as needed for Itching, Disp-20 capsule, R-0Print                (Please note that portions of this note were completed with a voice recognition program.Efforts were made to edit the dictations but occasionally words are mis-transcribed.)    Teddy Mesa MD (electronically signed)  Attending Emergency Physician          Teddy Mesa MD  07/23/21 2987

## 2021-08-05 ENCOUNTER — HOSPITAL ENCOUNTER (EMERGENCY)
Age: 31
Discharge: HOME | End: 2021-08-05
Attending: EMERGENCY MEDICINE
Payer: COMMERCIAL

## 2021-08-05 VITALS
OXYGEN SATURATION: 98 % | BODY MASS INDEX: 21.39 KG/M2 | TEMPERATURE: 98.2 F | RESPIRATION RATE: 16 BRPM | HEIGHT: 73 IN | HEART RATE: 70 BPM | WEIGHT: 161.38 LBS | DIASTOLIC BLOOD PRESSURE: 64 MMHG | SYSTOLIC BLOOD PRESSURE: 107 MMHG

## 2021-08-05 DIAGNOSIS — J18.9 PNEUMONIA OF BOTH LOWER LOBES DUE TO INFECTIOUS ORGANISM: Primary | ICD-10-CM

## 2021-08-05 PROCEDURE — 99283 EMERGENCY DEPT VISIT LOW MDM: CPT

## 2021-08-05 RX ORDER — AZITHROMYCIN 250 MG/1
TABLET, FILM COATED ORAL
Qty: 1 PACKET | Refills: 0 | Status: SHIPPED | OUTPATIENT
Start: 2021-08-05 | End: 2021-08-09

## 2021-08-05 RX ORDER — BENZONATATE 100 MG/1
100 CAPSULE ORAL 3 TIMES DAILY PRN
Qty: 30 CAPSULE | Refills: 0 | Status: SHIPPED | OUTPATIENT
Start: 2021-08-05 | End: 2021-08-12

## 2021-08-05 NOTE — ED PROVIDER NOTES
eMERGENCY dEPARTMENT eNCOUnter      Pt Name: Sherice Dubose  MRN: [id number]  [place] 1990  Date of evaluation: 8/5/2021  Provider: Vy Atkinson MD     [address] [place] [address]       Chief Complaint   Patient presents with    Fatigue     pt states that he was just seen here and is still not feeling well. Pt feels weak and needs more time off work to sleep but needs a work note. HISTORY OF PRESENT ILLNESS   (Location/Symptom, Timing/Onset,Context/Setting, Quality, Duration, Modifying Factors, Severity) Note limiting factors. HPI    Sherice Dubose is a 26 y.o. male who presents to the emergency department with feeling fatigue and tired. Patient states has not felt well yet after he was treated for pneumonia last week. Patient try to go back to work today and was feeling too weak and fatigued. There has been still an nonproductive cough. Patient works in a factory. He also has a chronic rash. Not itchy. Patient wants a work note. Patient states he might need more time. Nursing Notes were reviewed. REVIEW OFSYSTEMS    (2+ for level 4; 10+ for level 5)   Review of Systems    General: No fevers, chills or night sweats, No weight loss    Head:  No Sore throat,  No Ear Pain    Chest:  Nontender. No Cough, No SOB,  Chest Pain    GI: No abdominal pain or vomiting    : No dysuria or hematuria    Musculoskeletal: No unrelenting pain or night pain    Neurologic: No bowel or bladder incontinence, No saddle anesthesia, No leg weakness    All other systems reviewed and are negative. PAST MEDICAL HISTORY     Past Medical History:   Diagnosis Date    Acute hemorrhoid        SURGICAL HISTORY       Past Surgical History:   Procedure Laterality Date    HEMORRHOID SURGERY      HERNIA REPAIR      bilateral inguinal       CURRENT MEDICATIONS       Previous Medications    ANUSOL-HC 2.5 % CREA RECTAL CREAM    Apply to rectum TID PRN for anal pain. Do not use with suppositories.     HYDROCORTISONE (ANUSOL-HC) 25 MG SUPPOSITORY    Place 1 suppository rectally 2 times daily       ALLERGIES     Amoxicillin    FAMILY HISTORY     History reviewed. No pertinent family history. SOCIAL HISTORY       Social History     Socioeconomic History    Marital status: Single     Spouse name: None    Number of children: None    Years of education: None    Highest education level: None   Occupational History    None   Tobacco Use    Smoking status: Current Every Day Smoker     Packs/day: 3.00     Types: Cigarettes    Smokeless tobacco: Current User   Vaping Use    Vaping Use: Never used   Substance and Sexual Activity    Alcohol use: No    Drug use: No     Comment: started using again snorting heroin today    Sexual activity: Yes     Partners: Female   Other Topics Concern    None   Social History Narrative    None     Social Determinants of Health     Financial Resource Strain:     Difficulty of Paying Living Expenses:    Food Insecurity:     Worried About Running Out of Food in the Last Year:     Ran Out of Food in the Last Year:    Transportation Needs:     Lack of Transportation (Medical):      Lack of Transportation (Non-Medical):    Physical Activity:     Days of Exercise per Week:     Minutes of Exercise per Session:    Stress:     Feeling of Stress :    Social Connections:     Frequency of Communication with Friends and Family:     Frequency of Social Gatherings with Friends and Family:     Attends Mormon Services:     Active Member of Clubs or Organizations:     Attends Club or Organization Meetings:     Marital Status:    Intimate Partner Violence:     Fear of Current or Ex-Partner:     Emotionally Abused:     Physically Abused:     Sexually Abused:        SCREENINGS           PHYSICAL EXAM    (up to 7 for level 4, 8 or more for level 5)     ED Triage Vitals [08/05/21 1159]   BP Temp Temp src Pulse Resp SpO2 Height Weight   107/64 98.2 °F (36.8 °C) -- 70 16 98 % 6' 1\" (1.854 m) 161 lb 6 oz (73.2 kg)       Physical Exam    General: Alert and awake ×3. Nontoxic appearance. Well-developed well-nourished 75-year-old male in no distress. Patient is a smoker. HEENT: Normocephalic atraumatic. Neck is supple. Airway intact. No adenopathy  Cardiac: Regular rate and rhythm with no murmurs rubs or gallops  Pulmonary: Lungs are clear in all lung fields. No wheezing. No Rales. Abdomen: Soft and nontender. Negative hepatosplenomegaly. Bowel sounds are active  Extremities: Moving all extremities. No calf tenderness. Peripheral pulses all intact  Skin: Few small multiple bites in the extremity lower extremity specifically. Is chronic. Is been there for a while. Patient really did not mention it. Neurologic: Cranial nerves II through XII was grossly intact. Nonfocal neurological exam  Psychiatric: Patient is pleasant. Mood is appropriate. DIAGNOSTIC RESULTS     EKG (Per Emergency Physician):       RADIOLOGY (Per Emergency Physician): Interpretation per the Radiologist below, if available at the time of this note:  No results found. ED BEDSIDE ULTRASOUND:   Performed by ED Physician - none    LABS:  Labs Reviewed - No data to display     All other labs were within normal range or not returned as of this dictation. Procedures      EMERGENCY DEPARTMENT COURSE and DIFFERENTIAL DIAGNOSIS/MDM:   Vitals:    Vitals:    08/05/21 1159   BP: 107/64   Pulse: 70   Resp: 16   Temp: 98.2 °F (36.8 °C)   SpO2: 98%   Weight: 161 lb 6 oz (73.2 kg)   Height: 6' 1\" (1.854 m)       Medications - No data to display    MDM. Patient is a healthy 75-year-old recently diagnosed with pneumonia in the lower base been on Z-Jairo and Tessalon Perles which does help he is currently off of it now feeling weak. Does not think he can go back to work which is very exertional.  Patient has no fever. Still has a nonproductive cough. Exam is unremarkable.   Patient will be placed back on Tessalon Perles and another course of Z-Louise. Will go back to work in about 3 days. Discharged in good condition. REVAL:         CRITICAL CARE TIME   Total CriticalCare time was 0 minutes, excluding separately reportable procedures. There was a high probability of clinically significant/life threatening deterioration in the patient's condition which required my urgent intervention. CONSULTS:  None    PROCEDURES:  Unless otherwise noted below, none     [unfilled]    FINAL IMPRESSION      1. Pneumonia of both lower lobes due to infectious organism          DISPOSITION/PLAN   DISPOSITION Decision To Discharge 08/05/2021 12:37:51 PM      PATIENT REFERRED TO:  Family physician    Schedule an appointment as soon as possible for a visit in 1 week  If symptoms worsen      DISCHARGE MEDICATIONS:  New Prescriptions    AZITHROMYCIN (ZITHROMAX Z-LOUISE) 250 MG TABLET    Take 2 tablets (500 mg) on Day 1, and then take 1 tablet (250 mg) on days 2 through 5. BENZONATATE (TESSALON PERLES) 100 MG CAPSULE    Take 1 capsule by mouth 3 times daily as needed for Cough          (Please note:  Portions of this note were completed with a voice recognition program.Efforts were made to edit the dictations but occasionally words and phrases are mis-transcribed.)  Form v2016. J.[id number]    Rosibel TERRAZAS MD (electronically signed)  Emergency Medicine Provider        Jerson Jones MD  08/05/21 4775

## 2021-08-05 NOTE — ED NOTES
Pt d/c home with AVS and script x 2 sent to pharmacy , pt denies questions about f/u      Amadna Donato RN  08/05/21 6362

## 2021-12-19 ENCOUNTER — HOSPITAL ENCOUNTER (EMERGENCY)
Age: 31
Discharge: HOME | End: 2021-12-19
Payer: COMMERCIAL

## 2021-12-19 VITALS
DIASTOLIC BLOOD PRESSURE: 90 MMHG | OXYGEN SATURATION: 99 % | RESPIRATION RATE: 17 BRPM | HEART RATE: 75 BPM | SYSTOLIC BLOOD PRESSURE: 154 MMHG | TEMPERATURE: 98.4 F

## 2021-12-19 DIAGNOSIS — L85.3 DRY SKIN DERMATITIS: ICD-10-CM

## 2021-12-19 DIAGNOSIS — L03.90 CELLULITIS, UNSPECIFIED CELLULITIS SITE: Primary | ICD-10-CM

## 2021-12-19 DIAGNOSIS — F42.4 PICKING OWN SKIN: ICD-10-CM

## 2021-12-19 PROCEDURE — 99282 EMERGENCY DEPT VISIT SF MDM: CPT

## 2021-12-19 RX ORDER — CEPHALEXIN 500 MG/1
500 CAPSULE ORAL 4 TIMES DAILY
Qty: 40 CAPSULE | Refills: 0 | Status: SHIPPED | OUTPATIENT
Start: 2021-12-19 | End: 2021-12-29

## 2021-12-19 RX ORDER — SULFAMETHOXAZOLE AND TRIMETHOPRIM 800; 160 MG/1; MG/1
1 TABLET ORAL 2 TIMES DAILY
Qty: 20 TABLET | Refills: 0 | Status: SHIPPED | OUTPATIENT
Start: 2021-12-19 | End: 2021-12-29

## 2021-12-19 ASSESSMENT — PAIN DESCRIPTION - DESCRIPTORS: DESCRIPTORS: SORE

## 2021-12-19 ASSESSMENT — PAIN SCALES - GENERAL
PAINLEVEL_OUTOF10: 3
PAINLEVEL_OUTOF10: 3

## 2021-12-19 ASSESSMENT — PAIN DESCRIPTION - LOCATION: LOCATION: SCROTUM

## 2021-12-19 ASSESSMENT — PAIN DESCRIPTION - PAIN TYPE: TYPE: ACUTE PAIN

## 2021-12-19 NOTE — ED PROVIDER NOTES
[facility] ENCOUNTER        Pt Name: Lelon Spatz  MRN: [id number]  [place] 1990  Date of evaluation: 12/19/2021  Provider: RADHA Mir      ADVANCED PRACTICE PROVIDER, I HAVE EVALUATED THIS PATIENT    CHIEF COMPLAINT     Scrotal itching and swelling  Scabs on arms    HISTORY OF PRESENT ILLNESS  (Location/Symptom, Timing/Onset, Context/Setting, Quality, Duration,Modifying Factors, Severity.)   Lelon Spatz is a 33 y.o. male who presents to the emergencydepartment for scrotal itching and swelling for the past 4 days. Also reports skin is flaking. denies testicular pain. Denies penile discharge or dysuria. Also reports he has been getting pimples on his bilateral arms and legs and pops them then they get infected. This has happened in the past and has cleared up with antibiotic use. Denies fever chills nausea vomiting abdominal pain. Denies IVDU. Nursing Notes were reviewed and I agree. REVIEW OF SYSTEMS    (2-9 systems for level 4, 10 or more for level 5)   Review of Systems     Pertinent positives and negatives as per HPI. PAST MEDICAL HISTORY         Diagnosis Date    Acute hemorrhoid        SURGICAL HISTORY         Procedure Laterality Date    HEMORRHOID SURGERY      HERNIA REPAIR      bilateral inguinal       CURRENT MEDICATIONS       Discharge Medication List as of 12/19/2021  2:16 PM      CONTINUE these medications which have NOT CHANGED    Details   ANUSOL-HC 2.5 % CREA rectal cream Apply to rectum TID PRN for anal pain. Do not use with suppositories. , Disp-1 Tube, R-1, DAWPrint      hydrocortisone (ANUSOL-HC) 25 MG suppository Place 1 suppository rectally 2 times daily, Disp-20 suppository, R-0Print             ALLERGIES     Amoxicillin and Tramadol    FAMILY HISTORY     History reviewed. No pertinent family history. No family status information on file.         SOCIAL HISTORY      reports that he has been smoking cigarettes. He has been smoking about 3.00 packs per day. He uses smokeless tobacco. He reports that he does not drink alcohol and does not use drugs. PHYSICAL EXAM    (up to 7 for level 4, 8 or more for level 5)     ED Triage Vitals [12/19/21 1341]   BP Temp Temp Source Pulse Resp SpO2 Height Weight   (!) 158/100 98.4 °F (36.9 °C) Oral 77 16 99 % -- --       Physical Exam  Constitutional:       General: He is not in acute distress. Appearance: Normal appearance. He is well-developed. He is not ill-appearing, toxic-appearing or diaphoretic. HENT:      Head: Normocephalic and atraumatic. Pulmonary:      Effort: Pulmonary effort is normal. No respiratory distress. Genitourinary:     Comments: The scrotum has no edema, but the skin does appear very dry and is cracked and scabbed in a few places. Skin is slightly erythematous indurated. There is no fluctuant abscess. Testicles and epididymis are nontender to palpation bilaterally. Circumcised penis with few scabbed areas. No vesicles. The scabbed areas on the scrotum and penile shaft are nontender. No large or deep wounds in the genital area    Arun pa RN chaperoned my  exam  Musculoskeletal:      Cervical back: Normal range of motion and neck supple. Skin:     Comments: Multiple scabbed erythemateous areas scattered on the dorsal forearms and elbow. No significant surrounding erythema or edema. Appears to be healing    Also with similar scabbed areas on the anterior thighs    No petechiae, skin sloughing ulceration or vesicles   Neurological:      Mental Status: He is alert. Psychiatric:         Mood and Affect: Mood normal.         Behavior: Behavior normal.         Thought Content:  Thought content normal.         Judgment: Judgment normal.         DIFFERENTIAL DIAGNOSIS   Dry skin, cellulitis, abscess, herpes, syphilis, fourniers gangrene, tinea, toher    DIAGNOSTICRESULTS         RADIOLOGY:   Non-plain film images such as CT, Ultrasound and MRI are read by the radiologist. Plain radiographic images are visualized and preliminarily interpreted by RADHA Muro with the below findings:      Interpretation per the Radiologist below, if available at the time of this note:    No orders to display         LABS:  Labs Reviewed - No data to display    All other labs were within normal range or not returned as of this dictation. EMERGENCY DEPARTMENT COURSE and DIFFERENTIALDIAGNOSIS/MDM:   Vitals:    Vitals:    12/19/21 1341 12/19/21 1423   BP: (!) 158/100 (!) 154/90   Pulse: 77 75   Resp: 16 17   Temp: 98.4 °F (36.9 °C)    TempSrc: Oral    SpO2: 99% 99%       Patient wasnontoxic, well appearing, afebrile with normal vital signs with the exception of  /100. Denies testicular pain and testicles are nontender on exam.  Do not suspect torsion. This is a skin issue. Scrotal skin appears very dry and appears to have mild cellulitis. Low suspicion for abscess, sepsis and raymon's gangrene. Low suspicion for herpes and syphilis. Will treat with bactrim and keflex. Also has been picking at skin on arms and legs and may have become secondarily infected. Antibiotics will help with that. Instructed to FU with PCP in next few days for reevaluation. Instructed that if this gets worse or if he develops testicular pain, then he needs to return or go to [facility] ED. He agreed and understood    PROCEDURES:  None    FINAL IMPRESSION      1. Cellulitis, unspecified cellulitis site    2. Dry skin dermatitis    3.  Picking own skin        DISPOSITION/PLAN   DISPOSITION Decision To Discharge 12/19/2021 02:10:17 PM      PATIENT REFERRED TO:  [facility]) Pre-Services  [phone]  Schedule an appointment as soon as possible for a visit in 2 days  for reevaluation    [address]  [place] [address] [phone]    As needed, If symptoms worsen      DISCHARGE MEDICATIONS:  Discharge Medication List as of 12/19/2021  2:16 PM      START taking these medications    Details   mineral oil-hydrophilic petrolatum (AQUAPHOR) ointment Apply topically twice daily to scrotum. , Disp-50 g, R-0, Normal      sulfamethoxazole-trimethoprim (BACTRIM DS) 800-160 MG per tablet Take 1 tablet by mouth 2 times daily for 10 days, Disp-20 tablet, R-0Normal      cephALEXin (KEFLEX) 500 MG capsule Take 1 capsule by mouth 4 times daily for 10 days, Disp-40 capsule, R-0Normal             (Please note that portions ofthis note were completed with a voice recognition program.  Efforts were made to edit the dictations but occasionally words are mis-transcribed.)    RADHA Kenny [place]  12/19/21 3982

## 2023-03-31 ENCOUNTER — HOSPITAL ENCOUNTER (EMERGENCY)
Age: 33
Discharge: HOME | End: 2023-03-31
Payer: COMMERCIAL

## 2023-03-31 VITALS
DIASTOLIC BLOOD PRESSURE: 57 MMHG | OXYGEN SATURATION: 96 % | HEIGHT: 73 IN | TEMPERATURE: 97.7 F | HEART RATE: 80 BPM | SYSTOLIC BLOOD PRESSURE: 98 MMHG | RESPIRATION RATE: 18 BRPM | WEIGHT: 169 LBS | BODY MASS INDEX: 22.4 KG/M2

## 2023-03-31 DIAGNOSIS — L29.9 GENERALIZED PRURITUS: Primary | ICD-10-CM

## 2023-03-31 DIAGNOSIS — B35.4 TINEA CORPORIS: ICD-10-CM

## 2023-03-31 PROCEDURE — 99283 EMERGENCY DEPT VISIT LOW MDM: CPT

## 2023-03-31 RX ORDER — HYDROXYZINE PAMOATE 25 MG/1
25 CAPSULE ORAL 3 TIMES DAILY PRN
Qty: 30 CAPSULE | Refills: 0 | Status: SHIPPED | OUTPATIENT
Start: 2023-03-31 | End: 2023-04-10

## 2023-03-31 RX ORDER — CLOTRIMAZOLE 1 %
CREAM (GRAM) TOPICAL
Qty: 45 G | Refills: 0 | Status: SHIPPED | OUTPATIENT
Start: 2023-03-31 | End: 2023-04-07

## 2023-03-31 ASSESSMENT — PAIN - FUNCTIONAL ASSESSMENT: PAIN_FUNCTIONAL_ASSESSMENT: NONE - DENIES PAIN

## 2023-03-31 NOTE — ED PROVIDER NOTES
[address] ED  EMERGENCY DEPARTMENT ENCOUNTER        Pt Name: Jasson Stevens  MRN: [id number]  [place] 1990  Date of evaluation: 3/31/2023  Provider: RADHA Wei  PCP: No primary care provider on file. Note Started: 9:55 AM EDT 3/31/23      {Shared Not Shared:24996}      CHIEF COMPLAINT       Chief Complaint   Patient presents with   Dayla Hock     Pt states he has a rash he thinks is ring worm. HISTORY OF PRESENT ILLNESS: 1 or more Elements     {History from (Optional):64074}    {Limitations to history (Optional):98427}    Jasson Stevens is a 27 y.o. male with past medical history of tobacco abuse and hemorrhoids who presents via private vehicle from his home for evaluation of rash who presents ***    Nursing Notes were all reviewed and agreed with or any disagreements were addressed in the HPI. REVIEW OF SYSTEMS :      Review of Systems    Positives and Pertinent negatives as per HPI. SURGICAL HISTORY     Past Surgical History:   Procedure Laterality Date    HEMORRHOID SURGERY      HERNIA REPAIR      bilateral inguinal       CURRENTMEDICATIONS       Previous Medications    ANUSOL-HC 2.5 % CREA RECTAL CREAM    Apply to rectum TID PRN for anal pain. Do not use with suppositories. HYDROCORTISONE (ANUSOL-HC) 25 MG SUPPOSITORY    Place 1 suppository rectally 2 times daily    MINERAL OIL-HYDROPHILIC PETROLATUM (AQUAPHOR) OINTMENT    Apply topically twice daily to scrotum. ALLERGIES     Amoxicillin and Tramadol    FAMILYHISTORY     History reviewed. No pertinent family history.      SOCIAL HISTORY       Social History     Tobacco Use    Smoking status: Every Day     Packs/day: 3.00     Types: Cigarettes    Smokeless tobacco: Current   Vaping Use    Vaping Use: Never used   Substance Use Topics    Alcohol use: No    Drug use: No     Comment: started using again snorting heroin today       SCREENINGS        Mony Coma Scale  Eye Opening: Spontaneous  Best Verbal Tube, R-1, DAWPrint      hydrocortisone (ANUSOL-HC) 25 MG suppository Place 1 suppository rectally 2 times daily, Disp-20 suppository, R-0Print             ALLERGIES     Amoxicillin and Tramadol    FAMILYHISTORY     History reviewed. No pertinent family history. SOCIAL HISTORY       Social History     Tobacco Use    Smoking status: Every Day     Packs/day: 3.00     Types: Cigarettes    Smokeless tobacco: Current   Vaping Use    Vaping Use: Never used   Substance Use Topics    Alcohol use: No    Drug use: No     Comment: started using again snorting heroin today       SCREENINGS        Mony Coma Scale  Eye Opening: Spontaneous  Best Verbal Response: Oriented  Best Motor Response: Obeys commands  [place] Coma Scale Score: 15                CIWA Assessment  BP: (!) 98/57  Heart Rate: 80           PHYSICAL EXAM  1 or more Elements     ED Triage Vitals [03/31/23 0942]   BP Temp Temp Source Heart Rate Resp SpO2 Height Weight   (!) 98/57 97.7 °F (36.5 °C) Oral 80 18 96 % 6' 1\" (1.854 m) 169 lb (76.7 kg)       Physical Exam    PHYSICAL EXAM  BP (!) 98/57   Pulse 80   Temp 97.7 °F (36.5 °C) (Oral)   Resp 18   Ht 6' 1\" (1.854 m)   Wt 169 lb (76.7 kg)   SpO2 96%   BMI 22.30 kg/m²   GENERAL APPEARANCE: Awake and alert. Cooperative. Nontoxic-appearing adult male sitting upright in exam chair nondiaphoretic breathing comfortably on room air showing no sign of acute distress. Seen and evaluated by myself in room R1   HEAD: Normocephalic. Atraumatic. EYES: PERRL. EOM's grossly intact. ENT: Mucous membranes are moist.  No oral lesions. Pink Ignacio NECK: Supple. HEART: RRR. No murmurs. Radial pulses 2+ symmetric  LUNGS: Respirations unlabored. CTAB. Good air exchange. Speaking comfortably in full sentences. EXTREMITIES: No peripheral edema. Moves all extremities equally without assistance or difficulty. All extremities neurovascularly intact. SKIN: Warm and dry.   There are areas of excoriation and multiple scabs in

## 2023-11-11 ENCOUNTER — HOSPITAL ENCOUNTER (EMERGENCY)
Age: 33
Discharge: HOME | End: 2023-11-12
Attending: STUDENT IN AN ORGANIZED HEALTH CARE EDUCATION/TRAINING PROGRAM
Payer: COMMERCIAL

## 2023-11-11 DIAGNOSIS — T40.601A NARCOTIC OVERDOSE, ACCIDENTAL OR UNINTENTIONAL, INITIAL ENCOUNTER (HCC): Primary | ICD-10-CM

## 2023-11-11 PROCEDURE — 99284 EMERGENCY DEPT VISIT MOD MDM: CPT

## 2023-11-12 VITALS
DIASTOLIC BLOOD PRESSURE: 62 MMHG | RESPIRATION RATE: 15 BRPM | HEART RATE: 68 BPM | SYSTOLIC BLOOD PRESSURE: 105 MMHG | TEMPERATURE: 97.9 F | OXYGEN SATURATION: 98 % | BODY MASS INDEX: 20.74 KG/M2 | WEIGHT: 157.19 LBS

## 2023-11-12 ASSESSMENT — PAIN - FUNCTIONAL ASSESSMENT: PAIN_FUNCTIONAL_ASSESSMENT: NONE - DENIES PAIN

## 2023-11-12 NOTE — ED NOTES
Pt resting in bed with eyes closed, even unlabored respirations. Updated pt they are cleared for discharge. Bus pass will be provided to pt upon discharge.       Lauren Goodrich RN  11/12/23 1315

## 2023-11-12 NOTE — ED TRIAGE NOTES
Pt arrives via EMS for drug overdose. EMS states they gave 4 mg Narcan intranasal prior to arrival. Pt responds to questions. EMS states pt was found down at a gas station.

## 2023-11-12 NOTE — ED PROVIDER NOTES
[address] [facility] ENCOUNTER        Pt Name: Katalina Raya  MRN: [id number]  [address] [facility] 1990  Date of evaluation: 11/11/2023  Provider: Nasreen Nick MD  PCP: No primary care provider on file. Note Started: 6:19 AM EST 11/12/23    CHIEF COMPLAINT       Chief Complaint   Patient presents with    Drug Overdose     Pt arrives via EMS for drug overdose. EMS states they gave 4 mg Narcan intranasal prior to arrival. Pt responds to questions. EMS states pt was found down at a gas station. HISTORY OF PRESENT ILLNESS: 1 or more Elements     History from : Patient and EMS    Limitations to history : Intoxication    Katalina Raya is a 26 y.o. male who presents status post overdose, suspected narcotic use, EMS gave Narcan to improvement in patient's mental status, patient now able to answer questions, was found agonal respirations, 4 mg intranasal Narcan was given by EMS with improvement. Patient able to endorse drug use but otherwise not cooperative with initial history obtainment. Nursing Notes were all reviewed and agreed with or any disagreements were addressed in the HPI. REVIEW OF SYSTEMS :      Positives and Pertinent negatives as per HPI. ROS otherwise unremarkable. SURGICAL HISTORY     Past Surgical History:   Procedure Laterality Date    HEMORRHOID SURGERY      HERNIA REPAIR      bilateral inguinal       CURRENTMEDICATIONS       Current Discharge Medication List        CONTINUE these medications which have NOT CHANGED    Details   mineral oil-hydrophilic petrolatum (AQUAPHOR) ointment Apply topically twice daily to scrotum. Qty: 50 g, Refills: 0      ANUSOL-HC 2.5 % CREA rectal cream Apply to rectum TID PRN for anal pain. Do not use with suppositories.   Qty: 1 Tube, Refills: 1      hydrocortisone (ANUSOL-HC) 25 MG suppository Place 1 suppository rectally 2 times daily  Qty: 20 suppository, Refills: 0             ALLERGIES     Amoxicillin and

## 2023-11-12 NOTE — ED NOTES
Patient ambulatory from ED and provided with a bus pass home. AVS provided and discussed with patient. All questions answered. Patient verbalizes understanding of discharge instructions. Respirations even and easy. No obvious distress at this time.       Delores Webster, [address]  11/12/23 5872

## 2024-03-03 ENCOUNTER — HOSPITAL ENCOUNTER (EMERGENCY)
Age: 34
Discharge: HOME | End: 2024-03-03
Attending: STUDENT IN AN ORGANIZED HEALTH CARE EDUCATION/TRAINING PROGRAM
Payer: COMMERCIAL

## 2024-03-03 ENCOUNTER — APPOINTMENT (OUTPATIENT)
Dept: GENERAL RADIOLOGY | Age: 34
End: 2024-03-03
Attending: STUDENT IN AN ORGANIZED HEALTH CARE EDUCATION/TRAINING PROGRAM
Payer: COMMERCIAL

## 2024-03-03 VITALS
RESPIRATION RATE: 16 BRPM | HEIGHT: 73 IN | OXYGEN SATURATION: 97 % | SYSTOLIC BLOOD PRESSURE: 128 MMHG | BODY MASS INDEX: 19.99 KG/M2 | TEMPERATURE: 98.1 F | HEART RATE: 84 BPM | DIASTOLIC BLOOD PRESSURE: 81 MMHG | WEIGHT: 150.79 LBS

## 2024-03-03 DIAGNOSIS — S93.401A SPRAIN OF RIGHT ANKLE, UNSPECIFIED LIGAMENT, INITIAL ENCOUNTER: Primary | ICD-10-CM

## 2024-03-03 DIAGNOSIS — M79.671 RIGHT FOOT PAIN: ICD-10-CM

## 2024-03-03 PROCEDURE — 73630 X-RAY EXAM OF FOOT: CPT

## 2024-03-03 PROCEDURE — 73610 X-RAY EXAM OF ANKLE: CPT

## 2024-03-03 PROCEDURE — 6370000000 HC RX 637 (ALT 250 FOR IP): Performed by: STUDENT IN AN ORGANIZED HEALTH CARE EDUCATION/TRAINING PROGRAM

## 2024-03-03 PROCEDURE — 99284 EMERGENCY DEPT VISIT MOD MDM: CPT

## 2024-03-03 RX ORDER — ACETAMINOPHEN 500 MG
1000 TABLET ORAL ONCE
Status: COMPLETED | OUTPATIENT
Start: 2024-03-03 | End: 2024-03-03

## 2024-03-03 RX ORDER — IBUPROFEN 800 MG/1
800 TABLET ORAL ONCE
Status: COMPLETED | OUTPATIENT
Start: 2024-03-03 | End: 2024-03-03

## 2024-03-03 RX ADMIN — ACETAMINOPHEN 1000 MG: 500 TABLET ORAL at 13:22

## 2024-03-03 RX ADMIN — IBUPROFEN 800 MG: 800 TABLET, FILM COATED ORAL at 13:22

## 2024-03-03 ASSESSMENT — PAIN DESCRIPTION - PAIN TYPE: TYPE: ACUTE PAIN

## 2024-03-03 ASSESSMENT — PAIN SCALES - GENERAL
PAINLEVEL_OUTOF10: 3
PAINLEVEL_OUTOF10: 1
PAINLEVEL_OUTOF10: 4

## 2024-03-03 ASSESSMENT — PAIN DESCRIPTION - ORIENTATION: ORIENTATION: RIGHT

## 2024-03-03 ASSESSMENT — PAIN DESCRIPTION - FREQUENCY: FREQUENCY: CONTINUOUS

## 2024-03-03 ASSESSMENT — PAIN - FUNCTIONAL ASSESSMENT: PAIN_FUNCTIONAL_ASSESSMENT: PREVENTS OR INTERFERES SOME ACTIVE ACTIVITIES AND ADLS

## 2024-03-03 ASSESSMENT — PAIN DESCRIPTION - DESCRIPTORS: DESCRIPTORS: ACHING

## 2024-03-03 ASSESSMENT — LIFESTYLE VARIABLES
HOW MANY STANDARD DRINKS CONTAINING ALCOHOL DO YOU HAVE ON A TYPICAL DAY: PATIENT DOES NOT DRINK
HOW OFTEN DO YOU HAVE A DRINK CONTAINING ALCOHOL: NEVER

## 2024-03-03 ASSESSMENT — PAIN DESCRIPTION - ONSET: ONSET: ON-GOING

## 2024-03-03 ASSESSMENT — PAIN DESCRIPTION - LOCATION: LOCATION: FOOT

## 2024-03-03 NOTE — ED NOTES
Ace wrap applied to right ankle as ordered by ED MD.  Education provided, pt verbalized understanding, denies any further questions.

## 2024-03-03 NOTE — ED PROVIDER NOTES
Medications administered this visit:  (None if blank)  Medications   ibuprofen (ADVIL;MOTRIN) tablet 800 mg (800 mg Oral Given 3/3/24 1322)   acetaminophen (TYLENOL) tablet 1,000 mg (1,000 mg Oral Given 3/3/24 1322)         PROCEDURES: (None if blank)  Procedures:   ED PROCEDURE NOTE: SPLINTING/STRAPPING    The nurse applied a ace wrap to the injured extremity of the patient.  This provided stabilizationcare of the sprain. The area was examined post application and there was good alignment and good neurovascular function of the splinted/immobilized body part following the procedure.  The patient tolerated the procedure well.    Electronically verified by Wilmer Chavez MD      CRITICAL CARE: (None if blank)        DISCHARGE PRESCRIPTIONS: (None if blank)  New Prescriptions    No medications on file         I am the primary clinician of record.     FINAL IMPRESSION      1. Sprain of right ankle, unspecified ligament, initial encounter    2. Right foot pain          DISPOSITION/PLAN   DISPOSITION Discharge - Pending Orders Complete 03/03/2024 02:10:23 PM      OUTPATIENT FOLLOW UP THE PATIENT:  Robert Douglas MD  [address] [facility]  [address] [place] [address]  [phone]      Call to set up an appointment in the next few days, If symptoms worsen        Electronically Signed: Wilmer Chavez MD, 03/03/24, 2:15 PM    This report has been produced using speech recognition software and may contain errors related to that system including errors in grammar, punctuation, and spelling, as well as words and phrases that may be inappropriate. If there are any questions or concerns please feel free to contact the dictating provider for clarification.        Wilmer Chavez MD  03/03/24 6930

## 2024-03-03 NOTE — DISCHARGE INSTRUCTIONS
Wear your Ace wrap as directed.  You may take Tylenol Motrin for pain control. follow-up with orthopedic referral given to for further management and if symptoms or not improving.  Return if you develop any new or worsening symptoms

## 2024-03-03 NOTE — ED NOTES
Pt to ED with c/o pain to right foot and ankle.  States the pain started 3 days ago after jumping a fence. No obvious deformity noted.  Pedal pulses present and equal bilaterally.